# Patient Record
Sex: MALE | Race: BLACK OR AFRICAN AMERICAN | Employment: PART TIME | URBAN - METROPOLITAN AREA
[De-identification: names, ages, dates, MRNs, and addresses within clinical notes are randomized per-mention and may not be internally consistent; named-entity substitution may affect disease eponyms.]

---

## 2020-08-18 ENCOUNTER — APPOINTMENT (OUTPATIENT)
Dept: GENERAL RADIOLOGY | Facility: HOSPITAL | Age: 30
End: 2020-08-18
Attending: PHYSICIAN ASSISTANT
Payer: COMMERCIAL

## 2020-08-18 ENCOUNTER — HOSPITAL ENCOUNTER (EMERGENCY)
Facility: HOSPITAL | Age: 30
Discharge: HOME | End: 2020-08-18
Attending: PHYSICIAN ASSISTANT
Payer: COMMERCIAL

## 2020-08-18 VITALS
BODY MASS INDEX: 18.16 KG/M2 | OXYGEN SATURATION: 100 % | HEIGHT: 65 IN | WEIGHT: 109 LBS | HEART RATE: 60 BPM | DIASTOLIC BLOOD PRESSURE: 71 MMHG | TEMPERATURE: 98 F | SYSTOLIC BLOOD PRESSURE: 118 MMHG | RESPIRATION RATE: 16 BRPM

## 2020-08-18 DIAGNOSIS — R89.9 ABNORMAL LABORATORY TEST: ICD-10-CM

## 2020-08-18 DIAGNOSIS — R05.9 COUGH: Primary | ICD-10-CM

## 2020-08-18 LAB
ANION GAP SERPL CALC-SCNC: 1 MMOL/L (ref 0–18)
BASOPHILS # BLD AUTO: 0.05 X10(3) UL (ref 0–0.2)
BASOPHILS NFR BLD AUTO: 0.8 %
BILIRUB UR QL: NEGATIVE
BUN BLD-MCNC: 6 MG/DL (ref 7–18)
BUN/CREAT SERPL: 5.9 (ref 10–20)
CALCIUM BLD-MCNC: 8.8 MG/DL (ref 8.5–10.1)
CHLORIDE SERPL-SCNC: 107 MMOL/L (ref 98–112)
CLARITY UR: CLEAR
CO2 SERPL-SCNC: 32 MMOL/L (ref 21–32)
COLOR UR: YELLOW
CREAT BLD-MCNC: 1.01 MG/DL (ref 0.7–1.3)
DEPRECATED RDW RBC AUTO: 39.4 FL (ref 35.1–46.3)
EOSINOPHIL # BLD AUTO: 0.26 X10(3) UL (ref 0–0.7)
EOSINOPHIL NFR BLD AUTO: 4.3 %
ERYTHROCYTE [DISTWIDTH] IN BLOOD BY AUTOMATED COUNT: 11.5 % (ref 11–15)
GLUCOSE BLD-MCNC: 87 MG/DL (ref 70–99)
GLUCOSE UR-MCNC: NEGATIVE MG/DL
HCT VFR BLD AUTO: 44.3 % (ref 39–53)
HGB BLD-MCNC: 15.2 G/DL (ref 13–17.5)
HGB UR QL STRIP.AUTO: NEGATIVE
IMM GRANULOCYTES # BLD AUTO: 0.01 X10(3) UL (ref 0–1)
IMM GRANULOCYTES NFR BLD: 0.2 %
KETONES UR-MCNC: NEGATIVE MG/DL
LEUKOCYTE ESTERASE UR QL STRIP.AUTO: NEGATIVE
LYMPHOCYTES # BLD AUTO: 1.75 X10(3) UL (ref 1–4)
LYMPHOCYTES NFR BLD AUTO: 28.6 %
MCH RBC QN AUTO: 32.3 PG (ref 26–34)
MCHC RBC AUTO-ENTMCNC: 34.3 G/DL (ref 31–37)
MCV RBC AUTO: 94.1 FL (ref 80–100)
MONOCYTES # BLD AUTO: 0.43 X10(3) UL (ref 0.1–1)
MONOCYTES NFR BLD AUTO: 7 %
NEUTROPHILS # BLD AUTO: 3.61 X10 (3) UL (ref 1.5–7.7)
NEUTROPHILS # BLD AUTO: 3.61 X10(3) UL (ref 1.5–7.7)
NEUTROPHILS NFR BLD AUTO: 59.1 %
NITRITE UR QL STRIP.AUTO: NEGATIVE
OSMOLALITY SERPL CALC.SUM OF ELEC: 287 MOSM/KG (ref 275–295)
PH UR: 7 [PH] (ref 5–8)
PLATELET # BLD AUTO: 223 10(3)UL (ref 150–450)
POTASSIUM SERPL-SCNC: 4.1 MMOL/L (ref 3.5–5.1)
PROT UR-MCNC: NEGATIVE MG/DL
RBC # BLD AUTO: 4.71 X10(6)UL (ref 4.3–5.7)
SODIUM SERPL-SCNC: 140 MMOL/L (ref 136–145)
SP GR UR STRIP: 1.02 (ref 1–1.03)
UROBILINOGEN UR STRIP-ACNC: 2
WBC # BLD AUTO: 6.1 X10(3) UL (ref 4–11)

## 2020-08-18 PROCEDURE — 71045 X-RAY EXAM CHEST 1 VIEW: CPT | Performed by: PHYSICIAN ASSISTANT

## 2020-08-18 PROCEDURE — 81003 URINALYSIS AUTO W/O SCOPE: CPT | Performed by: PHYSICIAN ASSISTANT

## 2020-08-18 PROCEDURE — 85025 COMPLETE CBC W/AUTO DIFF WBC: CPT

## 2020-08-18 PROCEDURE — 36415 COLL VENOUS BLD VENIPUNCTURE: CPT

## 2020-08-18 PROCEDURE — 99283 EMERGENCY DEPT VISIT LOW MDM: CPT

## 2020-08-18 PROCEDURE — 80048 BASIC METABOLIC PNL TOTAL CA: CPT

## 2020-08-18 PROCEDURE — 80048 BASIC METABOLIC PNL TOTAL CA: CPT | Performed by: PHYSICIAN ASSISTANT

## 2020-08-18 PROCEDURE — 81003 URINALYSIS AUTO W/O SCOPE: CPT

## 2020-08-18 PROCEDURE — 85025 COMPLETE CBC W/AUTO DIFF WBC: CPT | Performed by: PHYSICIAN ASSISTANT

## 2020-08-18 NOTE — ED INITIAL ASSESSMENT (HPI)
States sent from PCP for a \"low neutrophil count\" on outpt blood work yesterday from outside facility. Pt does not have copy of labs with him. Presented to PCP yesterday for fatigue and SOB.  Reports negative COVID test 1.5 weeks ago with additional COVID

## 2020-08-18 NOTE — ED NOTES
Patient presents to ER with c/o cough x2 weeks, feelings of fatigue, and abnormal labs. Patients brother tested positive for covid 2 weeks ago. Denies fevers.

## 2020-08-19 NOTE — ED PROVIDER NOTES
Patient Seen in: [facility] AND [facility] Emergency Department    History   Patient presents with:  Abnormal Labs    Stated Complaint: sent by pcp, abnormal labs     HPI    Princess Tafoya is a 31year old male who presents with chief complaint of abnormal labs. room air as interpreted by this provider. Constitutional: The patient is cooperative. Appears well-developed and well-nourished. No acute distress. Psychological: Alert, No abnormalities of mood, affect. Head: Normocephalic/atraumatic. Nontender.   E Abnormality         Status                     ---------                               -----------         ------                     CBC W/ DIFFERENTIAL[[id number]]                              Final result                 Plea

## 2021-06-21 NOTE — ED INITIAL ASSESSMENT (HPI)
Patient presents to ER with mom stating he feels dizziness and unable to remember events that happened today, although he can recall all of his events that occurred today.      Denies drug use. states he only had a 5 hour energy shot today at about 10am.

## 2021-06-22 NOTE — BH LEVEL OF CARE ASSESSMENT
Crisis Evaluation Assessment    Carlotta Nagy YOB: 1990   Age 27year old MRN [id number]   Location [address] Attending Sushila Noguera MD      Patient's legal sex: male  Patient identifies as: male  Patient's of medication but did state that the patient had met with a therapist back in high school. Father denies any suicidal tendencies as well as HI/AVH. Father reports that he works for [employer] and has been functioning well at work.   Father also denies any issues reports as rarely. Is your experience of thoughts of dying by suicide: Frightening; A Coping Strategy  Protective Factors: family, friends, people who I would leave behind and not be selfish.   Past Suicidal Ideation: Ideation;Method/Plan;Intention;Rehersal helplessness/hopelessness, losses, significant loss; belief systems that view suicide as a solution; bullying or physical abuse; suicide contagion; job loss, financial stressors. :8307}          Non-Suicidal Self-Injury:   Patient denies current or histori memory or losing his loved ones. Patient does report a history of some trauma including losing some family including both great grandmothers, cousin and uncle withdrawal happened in her short period of time.     {Tip Address the presence of hallucinations, outpatient history, current providers and date of last visit, medications and compliance. :8307}        Relevant Social History:  Patient denies a history of abuse/trauma. Patient is not currently in school and has completed his bachelor's degree.   David Volume: Soft  Clarity: Mumbled;Clear  Cognition  Concentration: Other (comment) (reports feeling some of the effects of the edible still)  Memory: Recent memory intact; Remote memory intact  Orientation Level: Oriented to person;Oriented to time;Oriented to HI/AVH/self-harm/substance abuse. Patient denies a history of psychiatric hospitalizations or use of medication. Patient did meet with a therapist when he was a freshman in high school which she found helpful and supportive.   Patient is living at home wi cathy. Level of Care Recommendations  Consulted with: Dr. Emmitt Schirmer  Level of Care Recommendation: Outpatient  Outpatient Criteria: Regular therapy needed; Support needed  Outpatient Recommendations: Therapy  Referral 1: Cherri samuel

## 2021-06-22 NOTE — ED NOTES
Attempted to meet the patient for an assessment however patient was getting confused, feeling like he was in a dream, and saying he doesn't have any short term memory.  Patient is asking to have this writer repeat things and is getting lost in what he is sa

## 2021-06-22 NOTE — ED PROVIDER NOTES
Received in s/o from Dr. Juan Ramon Crabtree. Pt has cleared somewhat here. Seen by the psych counselor, deemed safe for discharge home. Lives with parents. Safety plan established. Given f/u referrals.

## 2021-06-22 NOTE — ED PROVIDER NOTES
Patient Seen in: [facility] AND [facility] Emergency Department      History   Patient presents with:  Dizziness  Eval-P    Stated Complaint: short term memory loss    HPI/Subjective:   HPI    Patient presents to the emergency department stating that he \"feels normal.   Cardiovascular:      Rate and Rhythm: Regular rhythm. Tachycardia present. Heart sounds: No murmur heard. Pulmonary:      Effort: Pulmonary effort is normal. No respiratory distress. Breath sounds: Normal breath sounds.    Abdominal: ---------                               -----------         ------                     CBC W/ DIFFERENTIAL[[id number]]          Abnormal            Final result                 Please view results for these tests on the individual orders.      EKG

## 2023-09-28 ENCOUNTER — HOSPITAL ENCOUNTER (OUTPATIENT)
Age: 33
Discharge: HOME | End: 2023-09-28
Payer: COMMERCIAL

## 2023-09-28 VITALS
DIASTOLIC BLOOD PRESSURE: 79 MMHG | OXYGEN SATURATION: 100 % | SYSTOLIC BLOOD PRESSURE: 98 MMHG | HEART RATE: 65 BPM | RESPIRATION RATE: 18 BRPM | TEMPERATURE: 98 F

## 2023-09-28 DIAGNOSIS — S39.012A BACK STRAIN, INITIAL ENCOUNTER: ICD-10-CM
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
## 2023-09-28 PROCEDURE — 99203 OFFICE O/P NEW LOW 30 MIN: CPT | Performed by: NURSE PRACTITIONER

## 2023-09-28 RX ORDER — CYCLOBENZAPRINE HCL 10 MG
10 TABLET ORAL 3 TIMES DAILY PRN
Qty: 10 TABLET | Refills: 0 | Status: SHIPPED | OUTPATIENT
Start: 2023-09-28 | End: 2023-10-05

## 2023-09-28 NOTE — DISCHARGE INSTRUCTIONS
Take ibuprofen 2 to 3 tablets every 6 hours as needed for pain. Take Flexeril to relax the tightness. Apply ice. Increase water intake. Gentle stretching. Symptoms should improve within the next 2 to 3 days.   If you are not feeling better you should follow-up with your primary doctor for recheck

## (undated) NOTE — LETTER
Date & Time: 9/28/2023, 12:57 PM  Patient: Boris Taylor  Encounter Provider(s):    MELISA Renee       To Whom It May Concern: Boris Taylor was seen and treated in our department on 9/28/2023. He should not return to work until 10/1/23 .     If you have any questions or concerns, please do not hesitate to call.        _____________________________  Physician/APC Signature

## (undated) NOTE — LETTER
Date & Time: 6/22/2021, 3:54 AM  Patient: Rina Stewart  Encounter Provider(s):    MD Spring Quiroz MD       To Whom It May Concern: Rina Stewart was seen and treated in our department on 6/21/2021.  He should not return to work